# Patient Record
Sex: FEMALE | URBAN - METROPOLITAN AREA
[De-identification: names, ages, dates, MRNs, and addresses within clinical notes are randomized per-mention and may not be internally consistent; named-entity substitution may affect disease eponyms.]

---

## 2019-07-24 ENCOUNTER — IMPORTED ENCOUNTER (OUTPATIENT)
Dept: URBAN - METROPOLITAN AREA CLINIC 31 | Facility: CLINIC | Age: 29
End: 2019-07-24

## 2019-07-24 PROCEDURE — 92004 COMPRE OPH EXAM NEW PT 1/>: CPT

## 2019-07-24 PROCEDURE — 92015 DETERMINE REFRACTIVE STATE: CPT

## 2019-07-24 NOTE — PATIENT DISCUSSION
1. Myopic Astigmat---  slight rx change2. Int in cls-- wore oasys  but dries-- order trials of dailies--Using eyemed for gls3.   RTN 1 yr CE  [place]

## 2019-08-05 ENCOUNTER — IMPORTED ENCOUNTER (OUTPATIENT)
Dept: URBAN - METROPOLITAN AREA CLINIC 31 | Facility: CLINIC | Age: 29
End: 2019-08-05

## 2019-08-05 PROCEDURE — 92310 CONTACT LENS FITTING OU: CPT

## 2019-08-05 NOTE — PATIENT DISCUSSION
1. Myopic Astigmat---  slight rx change2. Dailies did give enough astig and affected visions. Disp trials of Oasys 14 day with new rx--will wear only 8 hrs max and use more drops prior and after use. Oasys torics -1.50-2.75x080/-1.50-2.75x090  14 days DW. Tears qid. Eval 90.  -Using eyemed for gls--Pt will cakl and let me know if these work. If not maybe try Ultra or Antonella? ??3.   RTN 1 yr CE  [employer]

## 2020-07-24 ENCOUNTER — IMPORTED ENCOUNTER (OUTPATIENT)
Dept: URBAN - METROPOLITAN AREA CLINIC 31 | Facility: CLINIC | Age: 30
End: 2020-07-24

## 2020-07-24 PROCEDURE — 92014 COMPRE OPH EXAM EST PT 1/>: CPT

## 2020-07-24 PROCEDURE — 92310 CONTACT LENS FITTING OU: CPT

## 2020-07-24 NOTE — PATIENT DISCUSSION
1. Myopic Astigmat---  slight rx change2. Dailies did give enough astig and affected visions. 3.  Order trials of More moist  and monthly cls---Ultra -1.50-2.75x080/-1.50-2.75x090  14 days DW. Tears qid. Eval 90.  -Was wearing Oasys 14 day but only got 4-6 hrs of wear and dryness after 1-2 hrs.   3.  RTN 1 yr CE  [facility]

## 2021-07-28 ENCOUNTER — IMPORTED ENCOUNTER (OUTPATIENT)
Dept: URBAN - METROPOLITAN AREA CLINIC 31 | Facility: CLINIC | Age: 31
End: 2021-07-28

## 2021-07-28 PROCEDURE — 92014 COMPRE OPH EXAM EST PT 1/>: CPT

## 2021-07-28 PROCEDURE — 92310 CONTACT LENS FITTING OU: CPT

## 2021-07-28 PROCEDURE — 92015 DETERMINE REFRACTIVE STATE: CPT

## 2021-07-28 NOTE — PATIENT DISCUSSION
1. Myopic Astigmat---  slight rx change2. Dailies did give enough astig and affected visions. Only wears cls for running and eveinings--3. Order trials of More moist  and monthly cls---My day and Oasys 14 day--Did not like Ultra and Antonella. Order -1.50-2.75x080/-1.50-2.75x090  14 days DW. Tears qid. Eval 120 pd 102. IN past Was wearing Oasys 14 day but only got 4-6 hrs of wear and dryness after 1-2 hrs. Pt can pu trials 4.   RTN 1 yr CE  [employer]

## 2022-04-02 ASSESSMENT — VISUAL ACUITY
OS_SC: 20/20
OS_SC: 20/40
OD_SC: 20/40
OD_SC: 20/20
OS_CC: 20/200
OD_CC: 20/200
OD_CC: 20/200
OS_CC: 20/100+2

## 2022-04-02 ASSESSMENT — TONOMETRY
OS_IOP_MMHG: 12
OS_IOP_MMHG: 12
OD_IOP_MMHG: 13
OD_IOP_MMHG: 12
OS_IOP_MMHG: 12
OD_IOP_MMHG: 13

## 2022-08-01 ENCOUNTER — COMPREHENSIVE EXAM (OUTPATIENT)
Dept: URBAN - METROPOLITAN AREA CLINIC 34 | Facility: CLINIC | Age: 32
End: 2022-08-01

## 2022-08-01 DIAGNOSIS — H52.13: ICD-10-CM

## 2022-08-01 PROCEDURE — 92015 DETERMINE REFRACTIVE STATE: CPT

## 2022-08-01 PROCEDURE — 92014 COMPRE OPH EXAM EST PT 1/>: CPT

## 2022-08-01 ASSESSMENT — VISUAL ACUITY
OS_CC: 20/20
OD_CC: 20/25
OD_CC: 20/40-1
OS_CC: 20/25-3

## 2022-08-01 ASSESSMENT — TONOMETRY
OD_IOP_MMHG: 12
OS_IOP_MMHG: 11